# Patient Record
Sex: FEMALE | Race: WHITE
[De-identification: names, ages, dates, MRNs, and addresses within clinical notes are randomized per-mention and may not be internally consistent; named-entity substitution may affect disease eponyms.]

---

## 2017-11-10 NOTE — XRAY PRELIMINARY REPORT
Accession: [id number]

Exam: XR HAND 3 VIEW LT

 

IMPRESSION: 

1. Avulsion fracture of the proximal first proximal phalanx at MCP joint with displacement of a 4 mm 
bone fragment by 8 mm proximally.

 

[facility]

 

SITE ID: [id number]

## 2017-11-10 NOTE — XRAY REPORT
EXAM:

LEFT HAND RADIOGRAPHY

 

EXAM DATE: 11/10/2017 04:59 PM.

 

CLINICAL HISTORY: Fall with thumb base and hand pain.

 

COMPARISON: None.

 

TECHNIQUE: 3 views.

 

FINDINGS: 

Bones: There is a linear avulsion fracture from the first proximal phalanx at the metacarpal phalange
al joint. This linear fragment measures approximately 4 mm in length and is displaced by approximatel
y 8 mm with rotation. There is an incidental bone cyst in the third distal phalanx.

 

Joints: There is no dislocation. Joint spaces are preserved.

 

Soft Tissues: Normal. No soft tissue swelling.

 

IMPRESSION: 

1. Avulsion fracture of the proximal first proximal phalanx at MCP joint with displacement of a 4 mm 
bone fragment by 8 mm proximally.

 

[facility]

Referring Provider Line: [phone]

 

SITE ID: [id number]

## 2017-11-10 NOTE — ED PHYSICIAN DOCUMENTATION
PD HPI UPPER EXT INJURY





- Stated complaint


Stated Complaint: GLF/L HAND INJ





- History obtained from


History obtained from: Patient





- History of Present Illness


Location: Left, Finger (thumb base)


Type of injury: Fall (she slipped and fell forward, catching her fall with 

outstretched hand, got pain at base of thumb.)


Associated symptoms: Weakness, Swelling, Discolored.  No: Numbness


Contributing factors: No: Prior ortho surgery


Similar symptoms before: Has not had sx before


Recently seen: Not recently seen





Review of Systems


Skin: denies: Rash, Lesions, Abrasion (s), Laceration (s)


Musculoskeletal: denies: Extremity swelling


Neurologic: denies: Focal weakness, Numbness





PD PAST MEDICAL HISTORY





- Present Medications


Home Medications: 


 Ambulatory Orders











 Medication  Instructions  Recorded  Confirmed


 


Cholecalciferol (Vitamin D3) 2,000 unit PO DAILY 11/10/17 11/10/17





[Vitamin D]   


 


Estradiol [Vivelle-Dot] 1 each TD DAILY 11/10/17 11/10/17


 


Fluticasone [Flonase] 1 sprays JONO DAILY 11/10/17 11/10/17


 


Magnesium 500 mg PO DAILY 11/10/17 11/10/17


 


Multivitamin W/Minerals [Theragran 1 tab PO DAILY 11/10/17 11/10/17





M]   


 


Omega-3/Dha/Epa/Fish Oil [[place] 3 1 tab PO DAILY 11/10/17 11/10/17





500 Softgel]   


 


Progesterone,Micronized 100 mg PO DAILY 11/10/17 11/10/17





[Progesterone]   














- Allergies


Allergies/Adverse Reactions: 


 Allergies











Allergy/AdvReac Type Severity Reaction Status Date / Time


 


aspirin Allergy  Unknown Verified 11/10/17 16:26


 


ibuprofen Allergy  Unknown Verified 11/10/17 16:26














PD ED PE NORMAL





- Vitals


Vital signs reviewed: Yes





- General


General: Alert and oriented X 3, No acute distress, Well developed/nourished





- HEENT


HEENT: Atraumatic





- Neck


Neck: Supple, no meningeal sign, No adenopathy





- Cardiac


Cardiac: RRR, No murmur





- Respiratory


Respiratory: No respiratory distress, Clear bilaterally





- Derm


Derm: Normal color, Warm and dry





- Extremities


Extremities: Other (left thumb base with swelling and tenderness, without 

obfious deformity. )





Results





- Vitals


Vitals: 


 Vital Signs - 24 hr











  11/10/17





  16:21


 


Temperature 36.7 C


 


Heart Rate 75


 


Respiratory 16





Rate 


 


Blood Pressure 108/63


 


O2 Saturation 100








 Oxygen











O2 Source                      Room air

















- Rads (name of study)


  ** left hand


Radiology: Prelim report reviewed, EMP read contemporaneously (avulsion 

fracture base left thumb, will splint and have her f/u wit Ortho/Hand. )





Procedures





- Splint (location)


  ** left thumb


Splint applied by: Tech


Type of splint: Prefab velcro wrist, Thumb spica


Other: Patient tolerated well, No complications, Neurovascular intact





PD MEDICAL DECISION MAKING





- ED course


Complexity details: reviewed results, considered differential, d/w patient





Departure





- Departure


Disposition: 01 Home, Self Care


Clinical Impression: 


Fall from slip, trip, or stumble


Qualifiers:


 Encounter type: initial encounter Qualified Code(s): W01.0XXA - Fall on same 

level from slipping, tripping and stumbling without subsequent striking against 

object, initial encounter





Avulsion fracture of left thumb


Qualifiers:


 Encounter type: initial encounter Fracture type: closed Qualified Code(s): 

S62.502A - Fracture of unspecified phalanx of left thumb, initial encounter for 

closed fracture





Condition: Stable


Record reviewed to determine appropriate education?: Yes


Instructions:  ED Fx Thumb


Follow-Up: 


Sami Davila MD [Provider Admit Priv/Credential] - 


[facility] [Provider Group]


[facility] [Provider Group]


Comments: 


Splint for the thumb for the next 3 or 4 weeks.  However follow-up with a 

orthopedist or hand specialist in about 1-1-1/2 weeks for reevaluation.  There 

is a small avulsion fracture at the base of the thumb.  The concern with that 

is potential tear of the ulnar collateral ligament (UCL).  This sometimes need 

surgical repair.  Elevate rest and ice the hand and thumb often the next few 

days.  Tylenol if needed for pains.  I also provided the names of a couple of 

the clinics in the [place] area for obtaining primary care as well.


Discharge Date/Time: 11/10/17 17:28

## 2019-02-18 ENCOUNTER — HOSPITAL ENCOUNTER (EMERGENCY)
Dept: HOSPITAL 76 - ED | Age: 59
Discharge: HOME | End: 2019-02-18
Payer: COMMERCIAL

## 2019-02-18 VITALS — DIASTOLIC BLOOD PRESSURE: 75 MMHG | SYSTOLIC BLOOD PRESSURE: 118 MMHG

## 2019-02-18 DIAGNOSIS — N61.0: Primary | ICD-10-CM

## 2019-02-18 DIAGNOSIS — R03.0: ICD-10-CM

## 2019-02-18 LAB
ALBUMIN DIAFP-MCNC: 4.3 G/DL (ref 3.2–5.5)
ALBUMIN/GLOB SERPL: 1.3 {RATIO} (ref 1–2.2)
ALP SERPL-CCNC: 58 IU/L (ref 42–121)
ALT SERPL W P-5'-P-CCNC: 19 IU/L (ref 10–60)
ANION GAP SERPL CALCULATED.4IONS-SCNC: 8 MMOL/L (ref 6–13)
AST SERPL W P-5'-P-CCNC: 21 IU/L (ref 10–42)
BASOPHILS NFR BLD AUTO: 0.1 10^3/UL (ref 0–0.1)
BASOPHILS NFR BLD AUTO: 0.9 %
BILIRUB BLD-MCNC: 0.6 MG/DL (ref 0.2–1)
BUN SERPL-MCNC: 15 MG/DL (ref 6–20)
CALCIUM UR-MCNC: 9.2 MG/DL (ref 8.5–10.3)
CHLORIDE SERPL-SCNC: 101 MMOL/L (ref 101–111)
CO2 SERPL-SCNC: 27 MMOL/L (ref 21–32)
CREAT SERPLBLD-SCNC: 0.6 MG/DL (ref 0.4–1)
EOSINOPHIL # BLD AUTO: 0.2 10^3/UL (ref 0–0.7)
EOSINOPHIL NFR BLD AUTO: 2.8 %
ERYTHROCYTE [DISTWIDTH] IN BLOOD BY AUTOMATED COUNT: 12.9 % (ref 12–15)
GFRSERPLBLD MDRD-ARVRAT: 103 ML/MIN/{1.73_M2} (ref 89–?)
GLOBULIN SER-MCNC: 3.2 G/DL (ref 2.1–4.2)
GLUCOSE SERPL-MCNC: 100 MG/DL (ref 70–100)
HGB UR QL STRIP: 14.7 G/DL (ref 12–16)
LIPASE SERPL-CCNC: 30 U/L (ref 22–51)
LYMPHOCYTES # SPEC AUTO: 2.3 10^3/UL (ref 1.5–3.5)
LYMPHOCYTES NFR BLD AUTO: 26.5 %
MCH RBC QN AUTO: 31.9 PG (ref 27–31)
MCHC RBC AUTO-ENTMCNC: 33.2 G/DL (ref 32–36)
MCV RBC AUTO: 96.2 FL (ref 81–99)
MONOCYTES # BLD AUTO: 0.6 10^3/UL (ref 0–1)
MONOCYTES NFR BLD AUTO: 6.4 %
NEUTROPHILS # BLD AUTO: 5.4 10^3/UL (ref 1.5–6.6)
NEUTROPHILS # SNV AUTO: 8.6 X10^3/UL (ref 4.8–10.8)
NEUTROPHILS NFR BLD AUTO: 63.4 %
PDW BLD AUTO: 8.5 FL (ref 7.9–10.8)
PLATELET # BLD: 260 10^3/UL (ref 130–450)
PROT SPEC-MCNC: 7.5 G/DL (ref 6.7–8.2)
RBC MAR: 4.6 10^6/UL (ref 4.2–5.4)
SODIUM SERPLBLD-SCNC: 136 MMOL/L (ref 135–145)

## 2019-02-18 PROCEDURE — 85025 COMPLETE CBC W/AUTO DIFF WBC: CPT

## 2019-02-18 PROCEDURE — 80053 COMPREHEN METABOLIC PANEL: CPT

## 2019-02-18 PROCEDURE — 99283 EMERGENCY DEPT VISIT LOW MDM: CPT

## 2019-02-18 PROCEDURE — 36415 COLL VENOUS BLD VENIPUNCTURE: CPT

## 2019-02-18 PROCEDURE — 76641 ULTRASOUND BREAST COMPLETE: CPT

## 2019-02-18 PROCEDURE — 83690 ASSAY OF LIPASE: CPT

## 2019-02-18 NOTE — ULTRASOUND REPORT
Reason:  breast pain

Procedure Date:  02/18/2019   

Accession Number:  [id number] / [id number]                    

Procedure:  US  - Breast Unilateral Complete CPT Code:  

 

FULL RESULT:

 

 

EXAM:

 

 

EXAM DATE: 2/18/2019 07:40 PM.

 

CLINICAL HISTORY: Breast pain.

 

COMPARISON: None.

 

TECHNIQUE: Ultrasound of the right breast

 

FINDINGS

IMPRESSION:

 

No evidence for abscess.

 

A couple of hypoechoic structures measuring 0.3 cm and 0.4 cm short axis 

at 12:00, 2 cm from the nipple. These could reflect intramammary lymph 

nodes, indeterminate. Correlation with outpatient dedicated breast 

imaging is recommended.

 

[facility]

## 2019-02-18 NOTE — ED PHYSICIAN DOCUMENTATION
History of Present Illness





- Stated complaint


Stated Complaint: RIGHT BREAST PAIN





- Chief complaint


Chief Complaint: General





- History obtained from


History obtained from: Patient





- History of Present Illness


Timing: Today (This is a 58-year-old woman with benign past medical history has 

had mild breast pain that sort of diffuse over the last 3 weeks which was much 

worse today with tenderness especially for brushed upon anything.  There is no 

nipple discharge and she denies fevers chills or other signs or symptoms of 

systemic illness.)





Review of Systems


Constitutional: denies: Fever, Chills


Cardiac: denies: Chest pain / pressure, Palpitations


Respiratory: denies: Dyspnea, Cough





PD PAST MEDICAL HISTORY





- Past Medical History


Musculoskeletal: Osteoarthritis





- Past Surgical History


Past Surgical History: Yes


HEENT: Tonsil/Adenoidectomy





- Present Medications


Home Medications: 


                                Ambulatory Orders











 Medication  Instructions  Recorded  Confirmed


 


Cholecalciferol (Vitamin D3) 2,000 unit PO DAILY 11/10/17 11/10/17





[Vitamin D]   


 


Estradiol [Vivelle-Dot] 1 each TD DAILY 11/10/17 11/10/17


 


Fluticasone [Flonase] 1 sprays JONO DAILY 11/10/17 11/10/17


 


Magnesium 500 mg PO DAILY 11/10/17 11/10/17


 


Multivitamin W/Minerals [Theragran 1 tab PO DAILY 11/10/17 11/10/17





M]   


 


Omega-3/Dha/Epa/Fish Oil [[place] 3 1 tab PO DAILY 11/10/17 11/10/17





500 Softgel]   


 


Progesterone,Micronized 100 mg PO DAILY 11/10/17 11/10/17





[Progesterone]   


 


Cephalexin [Keflex] 500 mg PO Q6H #28 capsule 02/18/19 














- Allergies


Allergies/Adverse Reactions: 


                                    Allergies











Allergy/AdvReac Type Severity Reaction Status Date / Time


 


aspirin Allergy  Unknown Verified 02/18/19 18:45


 


ibuprofen Allergy  Unknown Verified 02/18/19 18:45














- Social History


Does the pt smoke?: No


Smoking Status: Never smoker


Does the pt drink ETOH?: Yes


Does the pt have substance abuse?: No





- Immunizations


Immunizations are current?: Yes





- POLST


Patient has POLST: No





PD ED PE NORMAL





- Vitals


Vital signs reviewed: Yes





- General


General: Alert and oriented X 3, No acute distress





- Cardiac


Cardiac: RRR, No murmur





- Respiratory


Respiratory: No respiratory distress, Clear bilaterally





- Abdomen


Abdomen: Non tender





- Derm


Derm: Other (Breast exam done with Elder young at bedside, she has diffuse 

tenderness of the right breast especially inferiorly, but I do not see any 

nipple changes, warmth, redness, swelling or discharge.  No axillary 

adenopathy.)





- Neuro


Neuro: Alert and oriented X 3, Normal speech





Results





- Vitals


Vitals: 


                               Vital Signs - 24 hr











  02/18/19





  18:42


 


Temperature 36.6 C


 


Heart Rate 69


 


Respiratory 18





Rate 


 


Blood Pressure 133/65 H


 


O2 Saturation 100








                                     Oxygen











O2 Source                      Room air

















- Labs


Labs: 


                                Laboratory Tests











  02/18/19 02/18/19





  19:02 19:02


 


WBC  8.6 


 


RBC  4.60 


 


Hgb  14.7 


 


Hct  44.2 


 


MCV  96.2 


 


MCH  31.9 H 


 


MCHC  33.2 


 


RDW  12.9 


 


Plt Count  260 


 


MPV  8.5 


 


Neut # (Auto)  5.4 


 


Lymph # (Auto)  2.3 


 


Mono # (Auto)  0.6 


 


Eos # (Auto)  0.2 


 


Baso # (Auto)  0.1 


 


Absolute Nucleated RBC  0.00 


 


Nucleated RBC %  0.0 


 


Sodium   136


 


Potassium   3.8


 


Chloride   101


 


Carbon Dioxide   27


 


Anion Gap   8.0


 


BUN   15


 


Creatinine   0.6


 


Estimated GFR (MDRD)   103


 


Glucose   100


 


Calcium   9.2


 


Total Bilirubin   0.6


 


AST   21


 


ALT   19


 


Alkaline Phosphatase   58


 


Total Protein   7.5


 


Albumin   4.3


 


Globulin   3.2


 


Albumin/Globulin Ratio   1.3


 


Lipase   30














- Rads (name of study)


  ** R breast sono


Radiology: EMP read contemporaneously (A couple of hypoechoic structures 

measuring 3 mm and 4 mm short axis II centimeters from the nipple that could be 

lymph nodes.)





PD MEDICAL DECISION MAKING





- ED course


ED course: 





This is a 58-year-old woman with relatively acute R breast pain without overt 

physical findings.  She has been getting routine mammography and is due next 

month.  No obvious findings on ultrasound here.  We will trial some antibiotics 

and she is encouraged to follow-up with her primary care physician and still get

 her mammogram next month as scheduled.





Departure





- Departure


Disposition: 01 Home, Self Care


Clinical Impression: 


 Mastitis in female





Condition: Good


Record reviewed to determine appropriate education?: Yes


Instructions:  Self Exam Breast, Mammography


Prescriptions: 


Cephalexin [Keflex] 500 mg PO Q6H #28 capsule


Comments: 


Return for new or worsening symptoms.  Follow-up next month for mammography as 

scheduled.  Also touch base with your primary care physician.





Your blood pressure was elevated today on check into the emergency department.  

This does not mean that you have hypertension, it is a common phenomenon to come

 to the emergency department and have elevated blood pressure.  I recommend that

 you see your primary care physician within the week to have it rechecked when 

you are feeling better.

## 2020-05-13 ENCOUNTER — HOSPITAL ENCOUNTER (EMERGENCY)
Dept: HOSPITAL 76 - ED | Age: 60
Discharge: HOME | End: 2020-05-13
Payer: COMMERCIAL

## 2020-05-13 VITALS — SYSTOLIC BLOOD PRESSURE: 123 MMHG | DIASTOLIC BLOOD PRESSURE: 65 MMHG

## 2020-05-13 DIAGNOSIS — L03.011: Primary | ICD-10-CM

## 2020-05-13 PROCEDURE — 99282 EMERGENCY DEPT VISIT SF MDM: CPT

## 2020-05-13 PROCEDURE — 99283 EMERGENCY DEPT VISIT LOW MDM: CPT

## 2020-05-13 NOTE — ED PHYSICIAN DOCUMENTATION
History of Present Illness





- Stated complaint


Stated Complaint: R INDEX FINGER PAIN





- Chief complaint


Chief Complaint: Wound





- History obtained from


History obtained from: Patient





- History of Present Illness


Timing: How many weeks ago (2)


Pain level max: 2


Pain level now: 1





- Additonal information


Additional information: 





Right index finger swelling for the past 2 weeks.  Initially started at the nail

fold at the base of the nail. Nothing makes it better or worse.  She has no 

tenderness along the palm of the hand.  Tried topical antibiotics without 

relief.





Review of Systems


Constitutional: denies: Fever





PD PAST MEDICAL HISTORY





- Past Medical History


Past Medical History: Yes


Musculoskeletal: Osteoarthritis, Osteoporosis





- Past Surgical History


Past Surgical History: Yes


HEENT: Tonsil/Adenoidectomy





- Present Medications


Home Medications: 


                                Ambulatory Orders











 Medication  Instructions  Recorded  Confirmed


 


Cholecalciferol (Vitamin D3) 2,000 unit PO DAILY 11/10/17 05/13/20





[Vitamin D]   


 


Estradiol [Vivelle-Dot] 1 each TD DAILY 11/10/17 05/13/20


 


Fluticasone [Flonase] 1 sprays JONO DAILY 11/10/17 05/13/20


 


Magnesium 500 mg PO DAILY 11/10/17 05/13/20


 


Multivitamin W/Minerals [Theragran 1 tab PO DAILY 11/10/17 05/13/20





M]   


 


Omega-3/Dha/Epa/Fish Oil [[place] 3 1 tab PO DAILY 11/10/17 05/13/20





500 Softgel]   


 


Progesterone, Micronized 100 mg PO DAILY 11/10/17 05/13/20





[Progesterone]   


 


Cephalexin [Keflex] 500 mg PO Q6H #28 capsule 05/13/20 














- Allergies


Allergies/Adverse Reactions: 


                                    Allergies











Allergy/AdvReac Type Severity Reaction Status Date / Time


 


aspirin Allergy  Unknown Verified 05/13/20 19:14


 


ibuprofen Allergy  Unknown Verified 05/13/20 19:14














- Social History


Does the pt smoke?: No


Smoking Status: Never smoker


Does the pt drink ETOH?: Yes


Does the pt have substance abuse?: No





- Immunizations


Immunizations are current?: Yes





- POLST


Patient has POLST: No





PD ED PE NORMAL





- Vitals


Vital signs reviewed: Yes





- General


General: Alert and oriented X 3, No acute distress





- HEENT


HEENT: Moist mucous membranes





- Derm


Derm: Warm and dry





- Extremities


Extremities: Other (mild erythema to the R index finger.  no tendon tenderness. 

no fluctuance.  FROM.  NVI)





- Neuro


Neuro: Alert and oriented X 3





Results





- Vitals


Vitals: 


                               Vital Signs - 24 hr











  05/13/20





  18:59


 


Temperature 36.4 C L


 


Heart Rate 71


 


Respiratory 16





Rate 


 


Blood Pressure 123/65


 


O2 Saturation 100








                                     Oxygen











O2 Source                      Room air

















PD MEDICAL DECISION MAKING





- ED course


Complexity details: considered differential, d/w patient


ED course: 


Patient appears to have a mild cellulitis of the finger. no evidence of deep 

space infection or toxic tenosynovitis.  will place on abx and follow up closely

 with her PCP.  Patient counseled regarding signs and symptoms for which I 

believe and urgent re-evaluation would be necessary. Patient with good 

understanding of and agreement to plan and is comfortable going home at this 

time





This document was made in part using voice recognition software. While efforts 

are made to proofread this document, sound alike and grammatical errors may 

occur.





Departure





- Departure


Disposition: 01 Home, Self Care


Clinical Impression: 


Cellulitis of finger


Qualifiers:


 Laterality: right Qualified Code(s): L03.011 - Cellulitis of right finger





Condition: Good


Instructions:  ED Infec Skin Cellulitis


Follow-Up: 


JENSEN MILLER MD [Primary Care Provider] - Within 3 Days


Prescriptions: 


Cephalexin [Keflex] 500 mg PO Q6H #28 capsule


Comments: 


Take all antibiotics until gone.  Return if you worsen.  This should improve 

quickly with the antibiotics.  If you are worsening or fail to improve, you 

should be reevaluated.  You should have a wound check with your doctor in 3 to 4

 days.


Discharge Date/Time: 05/13/20 19:37